# Patient Record
Sex: FEMALE | Race: WHITE | NOT HISPANIC OR LATINO | URBAN - METROPOLITAN AREA
[De-identification: names, ages, dates, MRNs, and addresses within clinical notes are randomized per-mention and may not be internally consistent; named-entity substitution may affect disease eponyms.]

---

## 2023-08-21 PROBLEM — I22.2 SUBSEQUENT NON-ST ELEVATION (NSTEMI) MYOCARDIAL INFARCTION (MULTI): Status: ACTIVE | Noted: 2023-08-21

## 2023-08-21 PROBLEM — R42 ORTHOSTATIC LIGHTHEADEDNESS: Status: ACTIVE | Noted: 2023-08-21

## 2023-08-21 PROBLEM — I25.111: Status: ACTIVE | Noted: 2023-08-21

## 2023-08-21 PROBLEM — Z95.810 AICD (AUTOMATIC CARDIOVERTER/DEFIBRILLATOR) PRESENT: Status: ACTIVE | Noted: 2023-08-21

## 2023-08-21 PROBLEM — I10 HTN (HYPERTENSION): Status: ACTIVE | Noted: 2023-08-21

## 2023-08-21 PROBLEM — E78.5 HYPERLIPIDEMIA: Status: ACTIVE | Noted: 2023-08-21

## 2023-08-21 PROBLEM — I46.9 CARDIOPULMONARY ARREST (MULTI): Status: ACTIVE | Noted: 2023-08-21

## 2023-08-21 PROBLEM — I47.20 SUSTAINED VT (VENTRICULAR TACHYCARDIA) (MULTI): Status: ACTIVE | Noted: 2023-08-21

## 2023-08-21 PROBLEM — I51.4 MYOCARDITIS (MULTI): Status: ACTIVE | Noted: 2023-08-21

## 2023-08-21 RX ORDER — AMIODARONE HYDROCHLORIDE 200 MG/1
1 TABLET ORAL DAILY
COMMUNITY
Start: 2022-06-10 | End: 2023-10-02 | Stop reason: SDUPTHER

## 2023-08-21 RX ORDER — NAPROXEN SODIUM 220 MG/1
1 TABLET, FILM COATED ORAL DAILY
COMMUNITY

## 2023-08-21 RX ORDER — FERROUS SULFATE 325(65) MG
TABLET ORAL
COMMUNITY
Start: 2022-04-12

## 2023-08-21 RX ORDER — HYDROCHLOROTHIAZIDE 12.5 MG/1
CAPSULE ORAL
COMMUNITY
Start: 2023-04-06 | End: 2023-10-02 | Stop reason: WASHOUT

## 2023-08-21 RX ORDER — CYANOCOBALAMIN 1000 UG/ML
1000 INJECTION, SOLUTION INTRAMUSCULAR; SUBCUTANEOUS
COMMUNITY
Start: 2021-11-23

## 2023-08-21 RX ORDER — METOPROLOL SUCCINATE 100 MG/1
1 TABLET, EXTENDED RELEASE ORAL DAILY
COMMUNITY
Start: 2022-06-10 | End: 2023-11-20 | Stop reason: WASHOUT

## 2023-08-21 RX ORDER — LEVOTHYROXINE SODIUM 100 UG/1
TABLET ORAL
COMMUNITY
Start: 2023-02-16

## 2023-08-21 RX ORDER — LOSARTAN POTASSIUM AND HYDROCHLOROTHIAZIDE 25; 100 MG/1; MG/1
1 TABLET ORAL EVERY MORNING
COMMUNITY
End: 2023-10-02 | Stop reason: WASHOUT

## 2023-08-21 RX ORDER — EZETIMIBE 10 MG/1
1 TABLET ORAL DAILY
COMMUNITY
Start: 2022-03-18

## 2023-08-21 RX ORDER — ATORVASTATIN CALCIUM 40 MG/1
1 TABLET, FILM COATED ORAL DAILY
COMMUNITY

## 2023-08-21 RX ORDER — LOSARTAN POTASSIUM 100 MG/1
1 TABLET ORAL DAILY
COMMUNITY
Start: 2022-03-18 | End: 2023-10-02 | Stop reason: WASHOUT

## 2023-10-02 ENCOUNTER — OFFICE VISIT (OUTPATIENT)
Dept: CARDIOLOGY | Facility: CLINIC | Age: 78
End: 2023-10-02
Payer: MEDICARE

## 2023-10-02 VITALS
OXYGEN SATURATION: 96 % | DIASTOLIC BLOOD PRESSURE: 84 MMHG | SYSTOLIC BLOOD PRESSURE: 138 MMHG | BODY MASS INDEX: 34.16 KG/M2 | WEIGHT: 205 LBS | HEART RATE: 86 BPM | HEIGHT: 65 IN

## 2023-10-02 DIAGNOSIS — I25.111 CORONARY ARTERY DISEASE INVOLVING NATIVE CORONARY ARTERY OF NATIVE HEART WITH ANGINA PECTORIS WITH DOCUMENTED SPASM (CMS-HCC): ICD-10-CM

## 2023-10-02 DIAGNOSIS — I47.20 SUSTAINED VT (VENTRICULAR TACHYCARDIA) (MULTI): Primary | ICD-10-CM

## 2023-10-02 PROCEDURE — 1159F MED LIST DOCD IN RCRD: CPT | Performed by: INTERNAL MEDICINE

## 2023-10-02 PROCEDURE — 3075F SYST BP GE 130 - 139MM HG: CPT | Performed by: INTERNAL MEDICINE

## 2023-10-02 PROCEDURE — 99214 OFFICE O/P EST MOD 30 MIN: CPT | Performed by: INTERNAL MEDICINE

## 2023-10-02 PROCEDURE — 3079F DIAST BP 80-89 MM HG: CPT | Performed by: INTERNAL MEDICINE

## 2023-10-02 PROCEDURE — 1160F RVW MEDS BY RX/DR IN RCRD: CPT | Performed by: INTERNAL MEDICINE

## 2023-10-02 PROCEDURE — 93000 ELECTROCARDIOGRAM COMPLETE: CPT | Performed by: INTERNAL MEDICINE

## 2023-10-02 RX ORDER — AMIODARONE HYDROCHLORIDE 200 MG/1
200 TABLET ORAL DAILY
Qty: 90 TABLET | Refills: 3 | Status: SHIPPED | OUTPATIENT
Start: 2023-10-02 | End: 2024-10-01

## 2023-10-02 RX ORDER — LOSARTAN POTASSIUM 50 MG/1
50 TABLET ORAL DAILY
COMMUNITY
Start: 2023-07-28 | End: 2023-11-20 | Stop reason: DRUGHIGH

## 2023-10-02 NOTE — PROGRESS NOTES
Subjective   Patient ID: Radha Addison is a 78 y.o. female who presents for Coronary Artery Disease.  This is a 78-year-old female with a history of sustained ventricular tachycardia.  She has an implantable defibrillator and is taking amiodarone.  No recent VT/VF episodes on the most recent ICD interrogation.  No side effects related to the use of amiodarone.  No other cardiac complaints today.        Review of Systems   All other systems reviewed and are negative.      Objective   Physical Exam  Constitutional:       Appearance: Normal appearance.   Cardiovascular:      Rate and Rhythm: Normal rate and regular rhythm.      Pulses: Normal pulses.      Heart sounds: Normal heart sounds.   Pulmonary:      Effort: Pulmonary effort is normal.      Breath sounds: Normal breath sounds.   Musculoskeletal:         General: No swelling.   Skin:     General: Skin is warm and dry.   Neurological:      General: No focal deficit present.      Mental Status: She is alert.   Psychiatric:         Mood and Affect: Mood normal.         Behavior: Behavior normal.         Assessment/Plan   Problem List Items Addressed This Visit             ICD-10-CM       Cardiac and Vasculature    Coronary artery disease involving native coronary artery of native heart with angina pectoris with documented spasm (CMS/[facility]) I25.111     1.  Sustained ventricular tachycardia: [place] has a history of sustained monomorphic ventricular tachycardia and received a secondary prevention ICD on March 10, 2022.  She has been taking amiodarone and has had a shock for VT.  The most recent ICD interrogation shows no VT/VF episodes.  Continue amiodarone at the same dosage.  If she continues to have suppression of the VT, we will consider decreasing the dosage of amiodarone to 100 mg daily.    2.  Hypertension: Continue losartan at the same dosage.         Relevant Orders    ECG 12 Lead    Sustained VT (ventricular tachycardia) (CMS/HCC) - Primary I47.20     Relevant Medications    amiodarone (Pacerone) 200 mg tablet    Other Relevant Orders    Follow Up In Cardiology

## 2023-10-02 NOTE — ASSESSMENT & PLAN NOTE
1.  Sustained ventricular tachycardia: [place] has a history of sustained monomorphic ventricular tachycardia and received a secondary prevention ICD on March 10, 2022.  She has been taking amiodarone and has had a shock for VT.  The most recent ICD interrogation shows no VT/VF episodes.  Continue amiodarone at the same dosage.  If she continues to have suppression of the VT, we will consider decreasing the dosage of amiodarone to 100 mg daily.    2.  Hypertension: Continue losartan at the same dosage.

## 2023-10-09 DIAGNOSIS — I47.29 PAROXYSMAL VENTRICULAR TACHYCARDIA (MULTI): ICD-10-CM

## 2023-10-09 DIAGNOSIS — Z95.810 AICD (AUTOMATIC CARDIOVERTER/DEFIBRILLATOR) PRESENT: ICD-10-CM

## 2023-11-16 PROBLEM — M54.16 RADICULOPATHY, LUMBAR REGION: Status: ACTIVE | Noted: 2018-08-16

## 2023-11-16 PROBLEM — E03.9 HYPOTHYROIDISM, ACQUIRED: Status: ACTIVE | Noted: 2023-02-16

## 2023-11-16 PROBLEM — R56.9 SEIZURE (MULTI): Status: ACTIVE | Noted: 2023-11-11

## 2023-11-16 PROBLEM — I22.2 SUBSEQUENT NON-ST ELEVATION (NSTEMI) MYOCARDIAL INFARCTION (MULTI): Status: RESOLVED | Noted: 2023-08-21 | Resolved: 2023-11-16

## 2023-11-16 PROBLEM — J30.1 CHRONIC SEASONAL ALLERGIC RHINITIS DUE TO POLLEN: Status: ACTIVE | Noted: 2017-11-27

## 2023-11-16 PROBLEM — I21.4 NSTEMI (NON-ST ELEVATED MYOCARDIAL INFARCTION) (MULTI): Status: ACTIVE | Noted: 2022-03-21

## 2023-11-16 PROBLEM — N18.31 STAGE 3A CHRONIC KIDNEY DISEASE (MULTI): Status: ACTIVE | Noted: 2023-10-03

## 2023-11-16 PROBLEM — Z86.79 HISTORY OF VENTRICULAR TACHYCARDIA: Status: ACTIVE | Noted: 2023-11-11

## 2023-11-20 ENCOUNTER — HOSPITAL ENCOUNTER (OUTPATIENT)
Dept: CARDIOLOGY | Facility: CLINIC | Age: 78
Discharge: HOME | End: 2023-11-20
Payer: MEDICARE

## 2023-11-20 ENCOUNTER — OFFICE VISIT (OUTPATIENT)
Dept: CARDIOLOGY | Facility: CLINIC | Age: 78
End: 2023-11-20
Payer: MEDICARE

## 2023-11-20 VITALS
BODY MASS INDEX: 33.79 KG/M2 | SYSTOLIC BLOOD PRESSURE: 102 MMHG | HEIGHT: 65 IN | OXYGEN SATURATION: 96 % | WEIGHT: 202.8 LBS | DIASTOLIC BLOOD PRESSURE: 48 MMHG | HEART RATE: 99 BPM

## 2023-11-20 DIAGNOSIS — E78.00 PURE HYPERCHOLESTEROLEMIA: ICD-10-CM

## 2023-11-20 DIAGNOSIS — Z09 HOSPITAL DISCHARGE FOLLOW-UP: ICD-10-CM

## 2023-11-20 DIAGNOSIS — Z95.810 PRESENCE OF AUTOMATIC CARDIOVERTER/DEFIBRILLATOR (AICD): ICD-10-CM

## 2023-11-20 DIAGNOSIS — I10 HYPERTENSION, UNSPECIFIED TYPE: Primary | ICD-10-CM

## 2023-11-20 DIAGNOSIS — I47.29 PAROXYSMAL VENTRICULAR TACHYCARDIA (MULTI): ICD-10-CM

## 2023-11-20 DIAGNOSIS — Z95.810 AICD (AUTOMATIC CARDIOVERTER/DEFIBRILLATOR) PRESENT: ICD-10-CM

## 2023-11-20 DIAGNOSIS — I47.20 VT (VENTRICULAR TACHYCARDIA) (MULTI): ICD-10-CM

## 2023-11-20 DIAGNOSIS — I47.20 SUSTAINED VT (VENTRICULAR TACHYCARDIA) (MULTI): ICD-10-CM

## 2023-11-20 DIAGNOSIS — I51.4 MYOCARDITIS, UNSPECIFIED CHRONICITY, UNSPECIFIED MYOCARDITIS TYPE (MULTI): ICD-10-CM

## 2023-11-20 DIAGNOSIS — I25.10 CORONARY ARTERY DISEASE INVOLVING NATIVE CORONARY ARTERY OF NATIVE HEART, UNSPECIFIED WHETHER ANGINA PRESENT: ICD-10-CM

## 2023-11-20 PROCEDURE — 1159F MED LIST DOCD IN RCRD: CPT | Performed by: INTERNAL MEDICINE

## 2023-11-20 PROCEDURE — 3078F DIAST BP <80 MM HG: CPT | Performed by: INTERNAL MEDICINE

## 2023-11-20 PROCEDURE — 93296 REM INTERROG EVL PM/IDS: CPT

## 2023-11-20 PROCEDURE — 1036F TOBACCO NON-USER: CPT | Performed by: INTERNAL MEDICINE

## 2023-11-20 PROCEDURE — 1160F RVW MEDS BY RX/DR IN RCRD: CPT | Performed by: INTERNAL MEDICINE

## 2023-11-20 PROCEDURE — 3074F SYST BP LT 130 MM HG: CPT | Performed by: INTERNAL MEDICINE

## 2023-11-20 PROCEDURE — 99215 OFFICE O/P EST HI 40 MIN: CPT | Performed by: INTERNAL MEDICINE

## 2023-11-20 PROCEDURE — 93295 DEV INTERROG REMOTE 1/2/MLT: CPT | Performed by: INTERNAL MEDICINE

## 2023-11-20 RX ORDER — LOSARTAN POTASSIUM 100 MG/1
100 TABLET ORAL DAILY
Qty: 90 TABLET | Refills: 3 | Status: SHIPPED | OUTPATIENT
Start: 2023-11-20 | End: 2024-05-28 | Stop reason: DRUGHIGH

## 2023-11-20 RX ORDER — LEVETIRACETAM 500 MG/1
500 TABLET, EXTENDED RELEASE ORAL 2 TIMES DAILY
COMMUNITY

## 2023-11-20 NOTE — PROGRESS NOTES
"Chief Complaint:   defibrillator, Coronary Artery Disease, Hypertension, Hyperlipidemia, Ventricular tachycardia, and myocarditis (6 month follow up)     History Of Present Illness:    Radha Addison is a 78 y.o. female presenting after hospital DC for a seizure.  Was noted to have low BP-multiple meds stopped  Occasional dizzines    No additional seizures  Patient denies chest pain/SOB/palpitations/edema/claudication  Activity limited-using walker       Last Recorded Vitals:  Vitals:    11/20/23 1332 11/20/23 1358   BP: 122/60 (!) 102/48   BP Location: Right arm    Patient Position: Sitting    BP Cuff Size: Large adult    Pulse: 99    SpO2: 96%    Weight: 92 kg (202 lb 12.8 oz)    Height: 1.651 m (5' 5\")             Allergies:  Pollen extracts    Outpatient Medications:  Current Outpatient Medications   Medication Instructions    amiodarone (PACERONE) 200 mg, oral, Daily    aspirin 81 mg chewable tablet 1 tablet, oral, Daily    atorvastatin (Lipitor) 40 mg tablet 1 tablet, oral, Daily    cholecalciferol, vitamin D3, (D-3-5 ORAL) 1 capsule, oral, Daily    cyanocobalamin (VITAMIN B-12) 1,000 mcg, intramuscular, Every 30 days    ezetimibe (Zetia) 10 mg tablet 1 tablet, oral, Daily    ferrous sulfate 325 (65 Fe) MG tablet oral    levETIRAcetam XR (KEPPRA XR) 500 mg, oral, 2 times daily, Do not crush, chew, or split.    levothyroxine (Synthroid, Levoxyl) 100 mcg tablet     losartan (COZAAR) 100 mg, oral, Daily    syringe with needle 3 mL 25 gauge x 1\" syringe miscellaneous, Use as directed with b12 injections        Physical Exam:  Constitutional:       General: Awake.      Appearance: Not in distress. Obese. Chronically ill-appearing.   Neck:      Vascular: No JVR. JVD normal.   Pulmonary:      Effort: Pulmonary effort is normal.      Breath sounds: Normal breath sounds. No wheezing. No rhonchi. No rales.   Chest:      Chest wall: Not tender to palpatation.   Cardiovascular:      PMI at left midclavicular line. " Normal rate. Regular rhythm. Normal S1. Normal S2.       Murmurs: There is no murmur.      No gallop.  No click. No rub.   Pulses:     Intact distal pulses.   Edema:     Peripheral edema absent.   Abdominal:      General: Abdomen is protuberant. Bowel sounds are normal.      Palpations: Abdomen is soft.      Tenderness: There is no abdominal tenderness.   Musculoskeletal: Normal range of motion.         General: No tenderness.      Comments: Walks with walker Skin:     General: Skin is warm and dry.   Neurological:      General: No focal deficit present.      Mental Status: Alert and oriented to person, place and time.          Last Labs:  CBC -  Lab Results   Component Value Date    WBC 9.3 03/11/2022    HGB 12.5 03/11/2022    HCT 39.0 03/11/2022    MCV 87 03/11/2022     03/11/2022       CMP -  Lab Results   Component Value Date    CALCIUM 9.4 02/08/2023    PROT 7.3 03/05/2022    ALBUMIN 4.2 03/05/2022    AST 18 03/05/2022    ALT 11 03/05/2022    ALKPHOS 105 03/05/2022    BILITOT 0.7 03/05/2022       LIPID PANEL -   Lab Results   Component Value Date    CHOL 107 03/06/2022    TRIG 114 03/06/2022    HDL 41.4 03/06/2022    CHHDL 2.6 03/06/2022    LDLF 43 03/06/2022    VLDL 23 03/06/2022       RENAL FUNCTION PANEL -   Lab Results   Component Value Date    GLUCOSE 100 (H) 02/08/2023     02/08/2023    K 4.6 02/08/2023     02/08/2023    CO2 28 02/08/2023    ANIONGAP 13 02/08/2023    BUN 21 02/08/2023    CREATININE 1.09 (H) 02/08/2023    CALCIUM 9.4 02/08/2023    ALBUMIN 4.2 03/05/2022        Lab Results   Component Value Date    HGBA1C 5.8 (H) 11/11/2023           Lab review: I have personally reviewed the laboratory result(s)       Problem List Items Addressed This Visit       AICD (automatic cardioverter/defibrillator) present    Overview     Placed 3/2022 for VT   Follow with EP         HTN (hypertension) - Primary    Overview     BP low on my reading today  Generally OK on home readings  Follow          Hyperlipidemia    Overview     On statin / Zetia   11/2023 LDL=42         Myocarditis (CMS/[facility])    Overview     Per 3/2022 cardiac MRI   NL LVEF   No obstructive CAD per 3/2022 cath   On ARB  Follow         Sustained VT (ventricular tachycardia) (CMS/[facility])    Overview     Noted on 3/2022 hospital admit   On amiodarone and AICD in place   Note -was on beta blocker but Dc'ed durinh 11/2023 hospital admission for low BP/seizure  Follow         CAD (coronary artery disease)    Overview     Mild irregularities with intramyocardial LAD per 3/2022 cath   On ASA / statin / beta blocker - no angina         Hospital discharge follow-up    Overview     Records reviewed                Giovani Burns DO

## 2024-02-19 ENCOUNTER — HOSPITAL ENCOUNTER (OUTPATIENT)
Dept: CARDIOLOGY | Facility: CLINIC | Age: 79
Discharge: HOME | End: 2024-02-19
Payer: MEDICARE

## 2024-02-19 DIAGNOSIS — I47.29 PAROXYSMAL VENTRICULAR TACHYCARDIA (MULTI): ICD-10-CM

## 2024-02-19 DIAGNOSIS — Z95.810 AICD (AUTOMATIC CARDIOVERTER/DEFIBRILLATOR) PRESENT: ICD-10-CM

## 2024-02-19 PROCEDURE — 93296 REM INTERROG EVL PM/IDS: CPT

## 2024-02-19 PROCEDURE — 93295 DEV INTERROG REMOTE 1/2/MLT: CPT | Performed by: INTERNAL MEDICINE

## 2024-05-09 PROBLEM — G40.309 NONINTRACTABLE GENERALIZED IDIOPATHIC EPILEPSY WITHOUT STATUS EPILEPTICUS (MULTI): Status: ACTIVE | Noted: 2023-11-22

## 2024-05-20 ENCOUNTER — HOSPITAL ENCOUNTER (OUTPATIENT)
Dept: CARDIOLOGY | Facility: CLINIC | Age: 79
Discharge: HOME | End: 2024-05-20
Payer: MEDICARE

## 2024-05-20 ENCOUNTER — APPOINTMENT (OUTPATIENT)
Dept: CARDIOLOGY | Facility: CLINIC | Age: 79
End: 2024-05-20
Payer: COMMERCIAL

## 2024-05-20 DIAGNOSIS — Z95.810 AICD (AUTOMATIC CARDIOVERTER/DEFIBRILLATOR) PRESENT: ICD-10-CM

## 2024-05-20 DIAGNOSIS — I47.29 PAROXYSMAL VENTRICULAR TACHYCARDIA (MULTI): ICD-10-CM

## 2024-05-28 ENCOUNTER — OFFICE VISIT (OUTPATIENT)
Dept: CARDIOLOGY | Facility: CLINIC | Age: 79
End: 2024-05-28
Payer: MEDICARE

## 2024-05-28 VITALS
BODY MASS INDEX: 33.28 KG/M2 | SYSTOLIC BLOOD PRESSURE: 105 MMHG | DIASTOLIC BLOOD PRESSURE: 50 MMHG | WEIGHT: 200 LBS | OXYGEN SATURATION: 96 % | HEART RATE: 78 BPM

## 2024-05-28 DIAGNOSIS — I51.4 MYOCARDITIS, UNSPECIFIED CHRONICITY, UNSPECIFIED MYOCARDITIS TYPE (MULTI): ICD-10-CM

## 2024-05-28 DIAGNOSIS — I25.10 CORONARY ARTERY DISEASE INVOLVING NATIVE CORONARY ARTERY OF NATIVE HEART, UNSPECIFIED WHETHER ANGINA PRESENT: Primary | ICD-10-CM

## 2024-05-28 DIAGNOSIS — I10 HYPERTENSION, UNSPECIFIED TYPE: ICD-10-CM

## 2024-05-28 DIAGNOSIS — E78.00 PURE HYPERCHOLESTEROLEMIA: ICD-10-CM

## 2024-05-28 DIAGNOSIS — Z95.810 AICD (AUTOMATIC CARDIOVERTER/DEFIBRILLATOR) PRESENT: ICD-10-CM

## 2024-05-28 DIAGNOSIS — I47.20 SUSTAINED VT (VENTRICULAR TACHYCARDIA) (MULTI): ICD-10-CM

## 2024-05-28 PROBLEM — R47.02 EXPRESSIVE DYSPHASIA: Status: ACTIVE | Noted: 2024-05-28

## 2024-05-28 PROBLEM — I63.9 CEREBROVASCULAR ACCIDENT (CVA) OF UNCERTAIN PATHOLOGY (MULTI): Status: ACTIVE | Noted: 2024-05-28

## 2024-05-28 PROBLEM — R25.1 TREMOR: Status: ACTIVE | Noted: 2024-05-28

## 2024-05-28 PROCEDURE — 1036F TOBACCO NON-USER: CPT | Performed by: INTERNAL MEDICINE

## 2024-05-28 PROCEDURE — 3078F DIAST BP <80 MM HG: CPT | Performed by: INTERNAL MEDICINE

## 2024-05-28 PROCEDURE — 1159F MED LIST DOCD IN RCRD: CPT | Performed by: INTERNAL MEDICINE

## 2024-05-28 PROCEDURE — 3074F SYST BP LT 130 MM HG: CPT | Performed by: INTERNAL MEDICINE

## 2024-05-28 PROCEDURE — 99214 OFFICE O/P EST MOD 30 MIN: CPT | Performed by: INTERNAL MEDICINE

## 2024-05-28 RX ORDER — LOSARTAN POTASSIUM 100 MG/1
50 TABLET ORAL DAILY
Qty: 45 TABLET | Refills: 3 | Status: SHIPPED | OUTPATIENT
Start: 2024-05-28 | End: 2025-05-28

## 2024-05-28 RX ORDER — OLOPATADINE HYDROCHLORIDE 2 MG/ML
1 SOLUTION/ DROPS OPHTHALMIC 2 TIMES DAILY
COMMUNITY
Start: 2024-04-04 | End: 2024-10-01

## 2024-05-28 RX ORDER — HYDRALAZINE HYDROCHLORIDE 25 MG/1
TABLET, FILM COATED ORAL
COMMUNITY
Start: 2023-11-06 | End: 2024-05-28 | Stop reason: WASHOUT

## 2024-05-28 NOTE — PROGRESS NOTES
"Chief Complaint:   Follow-up (Patient is here for a follow up)     History Of Present Illness:    Radha Addison is a 79 y.o. female presenting for CV dz  Occasional orthostatic LH  Patient denies chest pain/SOB/palpitations/edema/claudication  Active-walks/PT         Last Recorded Vitals:  Vitals:    05/28/24 1416 05/28/24 1439   BP: 140/59 105/50   BP Location: Right arm    Patient Position: Sitting    BP Cuff Size: Large adult    Pulse: 78    SpO2: 96%    Weight: 90.7 kg (200 lb)             Allergies:  Pollen extracts    Outpatient Medications:  Current Outpatient Medications   Medication Instructions    amiodarone (PACERONE) 200 mg, oral, Daily    aspirin 81 mg chewable tablet 1 tablet, oral, Daily    atorvastatin (Lipitor) 40 mg tablet 1 tablet, oral, Daily    cholecalciferol, vitamin D3, (D-3-5 ORAL) 1 capsule, oral, Daily    cyanocobalamin (VITAMIN B-12) 1,000 mcg, intramuscular, Every 30 days    ezetimibe (Zetia) 10 mg tablet 1 tablet, oral, Daily    ferrous sulfate 325 (65 Fe) MG tablet oral    levETIRAcetam XR (KEPPRA XR) 500 mg, oral, 2 times daily, Do not crush, chew, or split.    levothyroxine (Synthroid, Levoxyl) 100 mcg tablet     losartan (COZAAR) 50 mg, oral, Daily    olopatadine (Pataday) 0.2 % ophthalmic solution 1 drop, ophthalmic (eye), 2 times daily    syringe with needle 3 mL 25 gauge x 1\" syringe miscellaneous, Use as directed with b12 injections        Physical Exam:  Constitutional:       General: Awake.      Appearance: Not in distress. Obese. Chronically ill-appearing.   Neck:      Vascular: No JVR. JVD normal.   Pulmonary:      Effort: Pulmonary effort is normal.      Breath sounds: Normal breath sounds. No wheezing. No rhonchi. No rales.   Chest:      Chest wall: Not tender to palpatation.   Cardiovascular:      PMI at left midclavicular line. Normal rate. Regular rhythm. Normal S1. Normal S2.       Murmurs: There is no murmur.      No gallop.  No click. No rub.   Pulses:     Intact " distal pulses.   Edema:     Peripheral edema absent.   Abdominal:      General: Abdomen is protuberant. Bowel sounds are normal.      Palpations: Abdomen is soft.      Tenderness: There is no abdominal tenderness.   Musculoskeletal: Normal range of motion.         General: No tenderness.      Comments: Walks with walker Skin:     General: Skin is warm and dry.   Neurological:      General: No focal deficit present.      Mental Status: Alert and oriented to person, place and time.          Last Labs:  CBC -  Lab Results   Component Value Date    WBC 9.3 03/11/2022    HGB 12.5 03/11/2022    HCT 39.0 03/11/2022    MCV 87 03/11/2022     03/11/2022       CMP -  Lab Results   Component Value Date    CALCIUM 9.4 02/08/2023    PROT 7.3 03/05/2022    ALBUMIN 4.2 03/05/2022    AST 18 03/05/2022    ALT 11 03/05/2022    ALKPHOS 105 03/05/2022    BILITOT 0.7 03/05/2022       LIPID PANEL -   Lab Results   Component Value Date    CHOL 107 03/06/2022    TRIG 114 03/06/2022    HDL 41.4 03/06/2022    CHHDL 2.6 03/06/2022    LDLF 43 03/06/2022    VLDL 23 03/06/2022 4/2024  Total cholesterol 114  Triglycerides 75  HDL 73  LDL 26    RENAL FUNCTION PANEL -   Lab Results   Component Value Date    GLUCOSE 100 (H) 02/08/2023     02/08/2023    K 4.6 02/08/2023     02/08/2023    CO2 28 02/08/2023    ANIONGAP 13 02/08/2023    BUN 21 02/08/2023    CREATININE 1.09 (H) 02/08/2023    CALCIUM 9.4 02/08/2023    ALBUMIN 4.2 03/05/2022 4/2024   Cr=1.11  K=4.2    Lab Results   Component Value Date    HGBA1C 5.8 (H) 11/11/2023           Lab review: I have personally reviewed the laboratory result(s)       Problem List Items Addressed This Visit       AICD (automatic cardioverter/defibrillator) present    Overview     Placed 3/2022 for VT   Follow with EP         HTN (hypertension)    Overview     BP low on my reading today  Has some orthostatic  symptoms-decrease losartan  Follow         Hyperlipidemia    Overview     On statin /  Zetia   11/2023 LDL=42         Myocarditis (Multi)    Overview     Per 3/2022 cardiac MRI (EF 58%)  NL LVEF   No obstructive CAD per 3/2022 cath   On ARB  No signs CHF         Sustained VT (ventricular tachycardia) (Multi)    Overview     Noted on 3/2022 hospital admit   On amiodarone and AICD in place   Note -was on beta blocker but Dc'ed during 11/2023 hospital admission for low BP/seizure  Follow         CAD (coronary artery disease) - Primary    Overview     Mild irregularities with intramyocardial LAD per 3/2022 cath   On ASA / statin (no beta  blocker with low B P)- no angina            Decrease losartan to 1/2 tablet(50 mg0 daily as BP on low side    Weight loss  Giovani Burns, DO

## 2024-06-04 ENCOUNTER — HOSPITAL ENCOUNTER (OUTPATIENT)
Dept: CARDIOLOGY | Facility: CLINIC | Age: 79
Discharge: HOME | End: 2024-06-04
Payer: MEDICARE

## 2024-06-04 DIAGNOSIS — Z95.810 PRESENCE OF AUTOMATIC (IMPLANTABLE) CARDIAC DEFIBRILLATOR: ICD-10-CM

## 2024-06-04 DIAGNOSIS — I47.29 OTHER VENTRICULAR TACHYCARDIA (MULTI): ICD-10-CM

## 2024-06-04 PROCEDURE — 93283 PRGRMG EVAL IMPLANTABLE DFB: CPT

## 2024-06-04 PROCEDURE — 93283 PRGRMG EVAL IMPLANTABLE DFB: CPT | Performed by: INTERNAL MEDICINE

## 2024-08-12 ENCOUNTER — HOSPITAL ENCOUNTER (OUTPATIENT)
Dept: CARDIOLOGY | Facility: CLINIC | Age: 79
Discharge: HOME | End: 2024-08-12
Payer: MEDICARE

## 2024-08-12 DIAGNOSIS — Z95.810 AICD (AUTOMATIC CARDIOVERTER/DEFIBRILLATOR) PRESENT: ICD-10-CM

## 2024-08-12 DIAGNOSIS — I47.29 PAROXYSMAL VENTRICULAR TACHYCARDIA (MULTI): ICD-10-CM

## 2024-08-19 ENCOUNTER — HOSPITAL ENCOUNTER (OUTPATIENT)
Dept: CARDIOLOGY | Facility: CLINIC | Age: 79
Discharge: HOME | End: 2024-08-19
Payer: MEDICARE

## 2024-08-19 DIAGNOSIS — I47.29 PAROXYSMAL VENTRICULAR TACHYCARDIA (MULTI): ICD-10-CM

## 2024-08-19 DIAGNOSIS — Z95.810 AICD (AUTOMATIC CARDIOVERTER/DEFIBRILLATOR) PRESENT: ICD-10-CM

## 2024-08-22 ENCOUNTER — OFFICE VISIT (OUTPATIENT)
Dept: CARDIOLOGY | Facility: CLINIC | Age: 79
End: 2024-08-22
Payer: MEDICARE

## 2024-08-22 VITALS
WEIGHT: 192 LBS | BODY MASS INDEX: 31.95 KG/M2 | HEART RATE: 87 BPM | DIASTOLIC BLOOD PRESSURE: 73 MMHG | SYSTOLIC BLOOD PRESSURE: 163 MMHG

## 2024-08-22 DIAGNOSIS — I47.20 SUSTAINED VT (VENTRICULAR TACHYCARDIA) (MULTI): ICD-10-CM

## 2024-08-22 DIAGNOSIS — I46.9 CARDIOPULMONARY ARREST (MULTI): Primary | ICD-10-CM

## 2024-08-22 DIAGNOSIS — I63.9: ICD-10-CM

## 2024-08-22 DIAGNOSIS — I48.0 PAROXYSMAL ATRIAL FIBRILLATION (MULTI): ICD-10-CM

## 2024-08-22 DIAGNOSIS — I25.119 CORONARY ARTERY DISEASE WITH ANGINA PECTORIS, UNSPECIFIED VESSEL OR LESION TYPE, UNSPECIFIED WHETHER NATIVE OR TRANSPLANTED HEART (CMS-HCC): ICD-10-CM

## 2024-08-22 PROCEDURE — 99214 OFFICE O/P EST MOD 30 MIN: CPT | Performed by: PHYSICIAN ASSISTANT

## 2024-08-22 PROCEDURE — 93005 ELECTROCARDIOGRAM TRACING: CPT | Performed by: PHYSICIAN ASSISTANT

## 2024-08-22 ASSESSMENT — ENCOUNTER SYMPTOMS
LOSS OF SENSATION IN FEET: 1
DEPRESSION: 0
OCCASIONAL FEELINGS OF UNSTEADINESS: 0

## 2024-08-22 NOTE — PROGRESS NOTES
Chief Complaint:   Newly realized paroxysmal atrial fibrillation     History Of Present Illness:    Radha Addison is a 79 y.o. female presenting with recently noted atrial fibrillation per recently completed ICD interrogation.  Patient recently presented for scheduled device interrogation which displayed no device discharges, however episodes of rapid atrial fibrillation.  [id number] = 5 consistent with high risk of CVA, therefore discussed initiation of Eliquis 5mg BID - patient is agreeable with this plan.  Patient denies chest pain, chest pressure, palpitations, dyspnea on exertion, shortness of breath at rest, diaphoresis, nausea/vomiting, back pain, headache, lightheadedness, dizziness, syncope or presyncopal episodes, active bleeding signs or symptoms, excessive weight gain, muscle or joint pain, claudication.       Last Recorded Vitals:  Vitals:    08/22/24 1429   BP: 163/73   BP Location: Left arm   Patient Position: Sitting   Pulse: 87   Weight: 87.1 kg (192 lb)       Past Medical History:  She has a past medical history of Personal history of other diseases of the circulatory system (03/18/2022).    Past Surgical History:  She has a past surgical history that includes Other surgical history (04/11/2022).      Social History:  She reports that she has never smoked. She has never used smokeless tobacco. No history on file for alcohol use and drug use.    Family History:  Family History   Problem Relation Name Age of Onset    No Known Problems Mother      No Known Problems Father      No Known Problems Child      No Known Problems Sibling          Allergies:  Pollen extracts    Outpatient Medications:  Current Outpatient Medications   Medication Instructions    amiodarone (PACERONE) 200 mg, oral, Daily    aspirin 81 mg chewable tablet 1 tablet, oral, Daily    atorvastatin (Lipitor) 40 mg tablet 1 tablet, oral, Daily    cholecalciferol, vitamin D3, (D-3-5 ORAL) 1 capsule, oral, Daily    cyanocobalamin  "(VITAMIN B-12) 1,000 mcg, intramuscular, Every 30 days    ezetimibe (Zetia) 10 mg tablet 1 tablet, oral, Daily    ferrous sulfate 325 (65 Fe) MG tablet oral    levETIRAcetam XR (KEPPRA XR) 500 mg, oral, 2 times daily, Do not crush, chew, or split.    levothyroxine (Synthroid, Levoxyl) 100 mcg tablet     losartan (COZAAR) 50 mg, oral, Daily    olopatadine (Pataday) 0.2 % ophthalmic solution 1 drop, ophthalmic (eye), 2 times daily    syringe with needle 3 mL 25 gauge x 1\" syringe miscellaneous, Use as directed with b12 injections        Physical Exam:  Constitutional: awake and alert, oriented ×3, no apparent distress  Skin: warm, dry, good turgor no obvious lesions  Eyes: pupils equal, round, reactive to light, conjunctiva pink and noninjected, no discharge  HENT: normocephalic and atraumatic, mucous membranes moist, trachea midline with no masses/goiter  Cardiovascular: S1/S2 regular, no murmur no rubs/gallops, no carotid bruits, no JVD  Pulmonary: symmetrical chest expansion, lungs are clear to auscultation bilaterally, no wheezes/rales/rhonchi, normal effort  Abdomen: nontender, nondistended, active bowel sounds, no ascites  Extremities: no cyanosis, clubbing, no LE edema no lesions; palpable pedal pulses  Neurologic: cranial nerves II - XII grossly intact, stable gait, no tremor       Last Labs:  CBC -  Lab Results   Component Value Date    WBC 9.3 03/11/2022    HGB 12.5 03/11/2022    HCT 39.0 03/11/2022    MCV 87 03/11/2022     03/11/2022       CMP -  Lab Results   Component Value Date    CALCIUM 9.4 02/08/2023    PROT 7.3 03/05/2022    ALBUMIN 4.2 03/05/2022    AST 18 03/05/2022    ALT 11 03/05/2022    ALKPHOS 105 03/05/2022    BILITOT 0.7 03/05/2022       LIPID PANEL -   Lab Results   Component Value Date    CHOL 107 03/06/2022    TRIG 114 03/06/2022    HDL 41.4 03/06/2022    CHHDL 2.6 03/06/2022    LDLF 43 03/06/2022    VLDL 23 03/06/2022       RENAL FUNCTION PANEL -   Lab Results   Component Value " "Date    GLUCOSE 100 (H) 02/08/2023     02/08/2023    K 4.6 02/08/2023     02/08/2023    CO2 28 02/08/2023    ANIONGAP 13 02/08/2023    BUN 21 02/08/2023    CREATININE 1.09 (H) 02/08/2023    CALCIUM 9.4 02/08/2023    ALBUMIN 4.2 03/05/2022        Lab Results   Component Value Date    HGBA1C 5.8 (H) 11/11/2023       Last Cardiology Tests:  ECG:  ECG 12 Lead 10/02/2023      Echo:  No results found for this or any previous visit from the past 1095 days.      Ejection Fractions:  No results found for: \"EF\"    Cath:  No results found for this or any previous visit from the past 1095 days.      Stress Test:  No results found for this or any previous visit from the past 1095 days.      Cardiac Imaging:  MR CARDIAC MORPHOLOGY AND FUNCTION W AND WO IV CONTRAST 03/09/2022      Assessment/Plan   Problem List Items Addressed This Visit             ICD-10-CM       Cardiac and Vasculature    Cardiopulmonary arrest (Multi) - Primary I46.9    Sustained VT (ventricular tachycardia) (Multi) I47.20    CAD (coronary artery disease) I25.10    Relevant Orders    ECG 12 lead (Clinic Performed)    Paroxysmal atrial fibrillation (Multi) I48.0    Relevant Medications    apixaban (Eliquis) 5 mg tablet    apixaban (Eliquis) 5 mg tablet       Neuro    Cerebrovascular accident (CVA) of uncertain pathology (Multi) I63.9       -[id number] = 5 consistent with high risk for thrombotic CVA    -DC ASA as patient has only mild CAD    -We will initiate Eliquis 5mg BID for CVA prophylaxis    -F/U with Dr. Ramicone and Dr. Burns as scheduled    Kai Andres PA-C  "

## 2024-09-09 PROBLEM — Z86.79 HISTORY OF VENTRICULAR TACHYCARDIA: Status: RESOLVED | Noted: 2023-11-11 | Resolved: 2024-09-09

## 2024-09-17 DIAGNOSIS — I48.0 PAROXYSMAL ATRIAL FIBRILLATION (MULTI): ICD-10-CM

## 2024-09-17 NOTE — TELEPHONE ENCOUNTER
Good afternoon,   Patient called, she was prescribed by Kai Andres Eliquis 5 mg, 2 tines daily. Kai sent this prescription to [facility], but patient said it supposed to go to to [facility]. I checked and yes it said [facility] but the Eliquis was sent to [facility]. Please if this can be fix since patient said she only have a couple pills left for this month.  Thanks  Mariaelena

## 2024-10-01 PROBLEM — I47.20 SUSTAINED VT (VENTRICULAR TACHYCARDIA) (MULTI): Chronic | Status: ACTIVE | Noted: 2023-08-21

## 2024-10-01 PROBLEM — Z95.810 AICD (AUTOMATIC CARDIOVERTER/DEFIBRILLATOR) PRESENT: Chronic | Status: ACTIVE | Noted: 2023-08-21

## 2024-10-02 ENCOUNTER — APPOINTMENT (OUTPATIENT)
Dept: CARDIOLOGY | Facility: CLINIC | Age: 79
End: 2024-10-02
Payer: COMMERCIAL

## 2024-10-02 VITALS
DIASTOLIC BLOOD PRESSURE: 80 MMHG | OXYGEN SATURATION: 97 % | HEART RATE: 99 BPM | BODY MASS INDEX: 31.49 KG/M2 | HEIGHT: 65 IN | WEIGHT: 189 LBS | SYSTOLIC BLOOD PRESSURE: 144 MMHG

## 2024-10-02 DIAGNOSIS — Z95.810 AICD (AUTOMATIC CARDIOVERTER/DEFIBRILLATOR) PRESENT: Primary | ICD-10-CM

## 2024-10-02 DIAGNOSIS — I47.20 SUSTAINED VT (VENTRICULAR TACHYCARDIA) (MULTI): ICD-10-CM

## 2024-10-02 PROCEDURE — 3077F SYST BP >= 140 MM HG: CPT | Performed by: INTERNAL MEDICINE

## 2024-10-02 PROCEDURE — 1036F TOBACCO NON-USER: CPT | Performed by: INTERNAL MEDICINE

## 2024-10-02 PROCEDURE — 99214 OFFICE O/P EST MOD 30 MIN: CPT | Performed by: INTERNAL MEDICINE

## 2024-10-02 PROCEDURE — 3079F DIAST BP 80-89 MM HG: CPT | Performed by: INTERNAL MEDICINE

## 2024-10-02 PROCEDURE — 1159F MED LIST DOCD IN RCRD: CPT | Performed by: INTERNAL MEDICINE

## 2024-10-02 NOTE — ASSESSMENT & PLAN NOTE
This patient has a history of sustained monomorphic ventricular tachycardia and received a secondary prevention ICD March 10, 2022.  No recent ICD shocks for VT/VF.  Continue amiodarone 200 mg daily.  She has been off beta-blocker therapy because of her tendency toward orthostatic hypotension, and she has had syncope secondary to orthostatic hypotension.  Continue amiodarone at the same dosage.  EP follow-up in 1 year.

## 2024-10-02 NOTE — PROGRESS NOTES
"History Of Present Illness:      This is a 78-year-old female with a history of sustained ventricular tachycardia.  She has a Medtronic ICD, and has been on amiodarone.  No recent VT/VF episodes.  She also has a history of orthostatic hypotension and has had syncope    Review of Systems  Other review of systems negative     Last Recorded Vitals:      5/17/2023     2:08 PM 10/2/2023     1:41 PM 11/20/2023     1:32 PM 11/20/2023     1:58 PM 5/28/2024     2:16 PM 5/28/2024     2:39 PM 8/22/2024     2:29 PM   Vitals   Systolic 140 138 122 102 140 105 163   Diastolic 65 84 60 48 59 50 73   Heart Rate  86 99  78  87   Height (in)  1.651 m (5' 5\") 1.651 m (5' 5\")       Weight (lb)  205 202.8  200  192   BMI  34.11 kg/m2 33.75 kg/m2  33.28 kg/m2  31.95 kg/m2   BSA (m2)  2.07 m2 2.05 m2  2.04 m2  2 m2   Visit Report  Report Report Report Report Report Report     Allergies:  Pollen extracts    Outpatient Medications:  Current Outpatient Medications   Medication Instructions    amiodarone (PACERONE) 200 mg, oral, Daily    apixaban (ELIQUIS) 5 mg, oral, 2 times daily    apixaban (ELIQUIS) 5 mg, oral, 2 times daily    aspirin 81 mg chewable tablet 1 tablet, oral, Daily    atorvastatin (Lipitor) 40 mg tablet 1 tablet, oral, Daily    cholecalciferol, vitamin D3, (D-3-5 ORAL) 1 capsule, oral, Daily    cyanocobalamin (VITAMIN B-12) 1,000 mcg, intramuscular, Every 30 days    ezetimibe (Zetia) 10 mg tablet 1 tablet, oral, Daily    ferrous sulfate 325 (65 Fe) MG tablet oral    levETIRAcetam XR (KEPPRA XR) 500 mg, oral, 2 times daily, Do not crush, chew, or split.    levothyroxine (Synthroid, Levoxyl) 100 mcg tablet     losartan (COZAAR) 50 mg, oral, Daily    olopatadine (Pataday) 0.2 % ophthalmic solution 1 drop, ophthalmic (eye), 2 times daily    syringe with needle 3 mL 25 gauge x 1\" syringe miscellaneous, Use as directed with b12 injections      Physical Exam:    General Appearance:  Alert, oriented, no distress  Skin:  Warm and " dry  Head and Neck:  No elevation of JVP, no carotid bruits  Cardiac Exam:  Rhythm is regular, S1 and S2 are normal, no murmur S3 or S4  Lungs:  Clear to auscultation  Extremities:  no edema  Neurologic:  No focal deficits  Psychiatric:  Appropriate mood and behavior    Cardiology Tests:  I have personally review the diagnostic cardiac testing and my interpretation is as follows:    Echocardiogram March 2022: Ejection fraction 60 to 65%    Assessment/Plan   Problem List Items Addressed This Visit             ICD-10-CM    AICD (automatic cardioverter/defibrillator) present - Primary (Chronic) Z95.810     Medtronic [place] XT DR MRI implanted March 10, 2022:  -RA Medtronic [id number]  -RV Medtronic [id number]  -Estimated battery longevity 9 years, 3 months based on device interrogation August 2024         Sustained VT (ventricular tachycardia) (Multi) (Chronic) I47.20     This patient has a history of sustained monomorphic ventricular tachycardia and received a secondary prevention ICD March 10, 2022.  No recent ICD shocks for VT/VF.  Continue amiodarone 200 mg daily.  She has been off beta-blocker therapy because of her tendency toward orthostatic hypotension, and she has had syncope secondary to orthostatic hypotension.  Continue amiodarone at the same dosage.  EP follow-up in 1 year.          James C Ramicone, DO

## 2024-10-02 NOTE — ASSESSMENT & PLAN NOTE
Medtronic [place] XT  MRI implanted March 10, 2022:  -RA Medtronic [id number]  -RV Medtronic [id number]  -Estimated battery longevity 9 years, 3 months based on device interrogation August 2024

## 2024-11-18 ENCOUNTER — HOSPITAL ENCOUNTER (OUTPATIENT)
Dept: CARDIOLOGY | Facility: CLINIC | Age: 79
Discharge: HOME | End: 2024-11-18
Payer: MEDICARE

## 2024-11-18 DIAGNOSIS — I47.29 PAROXYSMAL VENTRICULAR TACHYCARDIA (MULTI): ICD-10-CM

## 2024-11-18 DIAGNOSIS — Z95.810 AICD (AUTOMATIC CARDIOVERTER/DEFIBRILLATOR) PRESENT: ICD-10-CM

## 2024-11-18 PROCEDURE — 93296 REM INTERROG EVL PM/IDS: CPT

## 2024-11-27 ENCOUNTER — APPOINTMENT (OUTPATIENT)
Dept: CARDIOLOGY | Facility: CLINIC | Age: 79
End: 2024-11-27
Payer: MEDICARE

## 2024-11-27 VITALS
DIASTOLIC BLOOD PRESSURE: 55 MMHG | BODY MASS INDEX: 30.62 KG/M2 | WEIGHT: 184 LBS | HEART RATE: 66 BPM | SYSTOLIC BLOOD PRESSURE: 122 MMHG | OXYGEN SATURATION: 98 %

## 2024-11-27 DIAGNOSIS — I47.20 SUSTAINED VT (VENTRICULAR TACHYCARDIA) (MULTI): Chronic | ICD-10-CM

## 2024-11-27 DIAGNOSIS — I10 HYPERTENSION, UNSPECIFIED TYPE: ICD-10-CM

## 2024-11-27 DIAGNOSIS — I51.4 MYOCARDITIS, UNSPECIFIED CHRONICITY, UNSPECIFIED MYOCARDITIS TYPE (MULTI): ICD-10-CM

## 2024-11-27 DIAGNOSIS — I48.0 PAROXYSMAL ATRIAL FIBRILLATION (MULTI): ICD-10-CM

## 2024-11-27 DIAGNOSIS — I25.10 CORONARY ARTERY DISEASE INVOLVING NATIVE CORONARY ARTERY OF NATIVE HEART, UNSPECIFIED WHETHER ANGINA PRESENT: Primary | ICD-10-CM

## 2024-11-27 DIAGNOSIS — E78.00 PURE HYPERCHOLESTEROLEMIA: ICD-10-CM

## 2024-11-27 DIAGNOSIS — Z95.810 AICD (AUTOMATIC CARDIOVERTER/DEFIBRILLATOR) PRESENT: Chronic | ICD-10-CM

## 2024-11-27 PROCEDURE — 3074F SYST BP LT 130 MM HG: CPT | Performed by: INTERNAL MEDICINE

## 2024-11-27 PROCEDURE — G2211 COMPLEX E/M VISIT ADD ON: HCPCS | Performed by: INTERNAL MEDICINE

## 2024-11-27 PROCEDURE — 1159F MED LIST DOCD IN RCRD: CPT | Performed by: INTERNAL MEDICINE

## 2024-11-27 PROCEDURE — 3078F DIAST BP <80 MM HG: CPT | Performed by: INTERNAL MEDICINE

## 2024-11-27 PROCEDURE — 99214 OFFICE O/P EST MOD 30 MIN: CPT | Performed by: INTERNAL MEDICINE

## 2024-11-27 NOTE — PROGRESS NOTES
"Chief Complaint:   Follow-up and Hypertension     History Of Present Illness:    Radha Addison is a 79 y.o. female presenting for CV dz  Since her last office visit she was found to have paroxysmal atrial fibrillation on AICD interrogation and was started on Eliquis-no bleeding issues  No sustained palpitations  Occasional orthostatic LH  Patient denies chest pain/SOB/edema/claudication  Active-walks/PT         Last Recorded Vitals:  Vitals:    11/27/24 1329 11/27/24 1346   BP: 138/62 122/55   Pulse: 66    SpO2: 98%    Weight: 83.5 kg (184 lb)             Allergies:  Pollen extracts    Outpatient Medications:  Current Outpatient Medications   Medication Instructions    amiodarone (PACERONE) 200 mg, oral, Daily    apixaban (ELIQUIS) 5 mg, oral, 2 times daily    atorvastatin (Lipitor) 40 mg tablet 1 tablet, Daily    cholecalciferol, vitamin D3, (D-3-5 ORAL) 1 capsule, Daily    cyanocobalamin (VITAMIN B-12) 1,000 mcg, Every 30 days    ezetimibe (Zetia) 10 mg tablet 1 tablet, Daily    ferrous sulfate 325 (65 Fe) MG tablet Take by mouth.    levETIRAcetam XR (KEPPRA XR) 500 mg, 2 times daily    levothyroxine (Synthroid, Levoxyl) 100 mcg tablet     losartan (COZAAR) 50 mg, oral, Daily    olopatadine (Pataday) 0.2 % ophthalmic solution 1 drop, ophthalmic (eye), 2 times daily    syringe with needle 3 mL 25 gauge x 1\" syringe Use as directed with b12 injections       Physical Exam:  Constitutional:       General: Awake.      Appearance: Not in distress. Obese. Chronically ill-appearing.   Neck:      Vascular: No JVR. JVD normal.   Pulmonary:      Effort: Pulmonary effort is normal.      Breath sounds: Normal breath sounds. No wheezing. No rhonchi. No rales.   Chest:      Chest wall: Not tender to palpatation.   Cardiovascular:      PMI at left midclavicular line. Normal rate. Regular rhythm. Normal S1. Normal S2.       Murmurs: There is no murmur.      No gallop.  No click. No rub.   Pulses:     Intact distal pulses. "   Edema:     Peripheral edema absent.   Abdominal:      General: Abdomen is protuberant. Bowel sounds are normal.      Palpations: Abdomen is soft.      Tenderness: There is no abdominal tenderness.   Musculoskeletal: Normal range of motion.         General: No tenderness.      Comments: Walks with walker Skin:     General: Skin is warm and dry.   Neurological:      General: No focal deficit present.      Mental Status: Alert and oriented to person, place and time.          Last Labs:  CBC -  Lab Results   Component Value Date    WBC 9.3 03/11/2022    HGB 12.5 03/11/2022    HCT 39.0 03/11/2022    MCV 87 03/11/2022     03/11/2022       CMP -  Lab Results   Component Value Date    CALCIUM 9.4 02/08/2023    PROT 7.3 03/05/2022    ALBUMIN 4.2 03/05/2022    AST 18 03/05/2022    ALT 11 03/05/2022    ALKPHOS 105 03/05/2022    BILITOT 0.7 03/05/2022       LIPID PANEL -   Lab Results   Component Value Date    CHOL 107 03/06/2022    TRIG 114 03/06/2022    HDL 41.4 03/06/2022    CHHDL 2.6 03/06/2022    LDLF 43 03/06/2022    VLDL 23 03/06/2022 4/2024  Total cholesterol 114  Triglycerides 75  HDL 73  LDL 26    RENAL FUNCTION PANEL -   Lab Results   Component Value Date    GLUCOSE 100 (H) 02/08/2023     02/08/2023    K 4.6 02/08/2023     02/08/2023    CO2 28 02/08/2023    ANIONGAP 13 02/08/2023    BUN 21 02/08/2023    CREATININE 1.09 (H) 02/08/2023    CALCIUM 9.4 02/08/2023    ALBUMIN 4.2 03/05/2022 4/2024   Cr=1.11  K=4.2    Lab Results   Component Value Date    HGBA1C 5.8 (H) 11/11/2023           Lab review: I have personally reviewed the laboratory result(s)       Problem List Items Addressed This Visit       AICD (automatic cardioverter/defibrillator) present (Chronic)    Overview     Placed 3/2022 for VT   Follow with EP    Medtronic [place] XT  MRI implanted March 10, 2022:  -RA Medtronic [id number]  -RV Medtronic [id number]  -Estimated battery longevity 9 years, 3 months based on device interrogation August  2024         HTN (hypertension)    Overview     dBP low on my reading today  Has some orthostatic  symptoms-no med increase  Follow         Hyperlipidemia    Overview     On statin / Zetia   4/2024 LDL=26         Myocarditis (Multi)    Overview     Per 3/2022 cardiac MRI (EF 58%)  NL LVEF   No obstructive CAD per 3/2022 cath   On ARB  No signs CHF         Sustained VT (ventricular tachycardia) (Multi) (Chronic)    Overview     Noted on 3/2022 hospital admit   On amiodarone and AICD in place   Note -was on beta blocker but Dc'ed during 11/2023 hospital admission for low BP/seizure  Follow         CAD (coronary artery disease) - Primary    Overview     Mild irregularities with intramyocardial LAD per 3/2022 cath   On ASA / statin (no beta  blocker with low BP)- no angina         Paroxysmal atrial fibrillation (Multi)    Overview     Noted on 8/2022 AICD interrogation  In NSR today   Has [id number] score=3-on Eliquis                  Weight loss  Giovani Burns, DO

## 2025-02-17 ENCOUNTER — HOSPITAL ENCOUNTER (OUTPATIENT)
Dept: CARDIOLOGY | Facility: CLINIC | Age: 80
Discharge: HOME | End: 2025-02-17
Payer: MEDICARE

## 2025-02-17 DIAGNOSIS — Z95.810 AICD (AUTOMATIC CARDIOVERTER/DEFIBRILLATOR) PRESENT: ICD-10-CM

## 2025-02-17 DIAGNOSIS — I47.29 PAROXYSMAL VENTRICULAR TACHYCARDIA (MULTI): ICD-10-CM

## 2025-02-17 PROCEDURE — 93296 REM INTERROG EVL PM/IDS: CPT

## 2025-05-19 ENCOUNTER — HOSPITAL ENCOUNTER (OUTPATIENT)
Dept: CARDIOLOGY | Facility: CLINIC | Age: 80
Discharge: HOME | End: 2025-05-19
Payer: MEDICARE

## 2025-05-19 DIAGNOSIS — I47.20 PAROXYSMAL VENTRICULAR TACHYCARDIA: ICD-10-CM

## 2025-05-19 DIAGNOSIS — Z95.810 AICD (AUTOMATIC CARDIOVERTER/DEFIBRILLATOR) PRESENT: ICD-10-CM

## 2025-05-19 PROCEDURE — 93296 REM INTERROG EVL PM/IDS: CPT

## 2025-05-28 PROBLEM — H40.1131 PRIMARY OPEN-ANGLE GLAUCOMA, BILATERAL, MILD STAGE: Status: ACTIVE | Noted: 2025-03-13

## 2025-05-29 ENCOUNTER — OFFICE VISIT (OUTPATIENT)
Dept: CARDIOLOGY | Facility: CLINIC | Age: 80
End: 2025-05-29
Payer: MEDICARE

## 2025-05-29 VITALS
WEIGHT: 180 LBS | DIASTOLIC BLOOD PRESSURE: 72 MMHG | OXYGEN SATURATION: 96 % | BODY MASS INDEX: 29.95 KG/M2 | SYSTOLIC BLOOD PRESSURE: 140 MMHG | HEART RATE: 84 BPM

## 2025-05-29 DIAGNOSIS — E78.00 PURE HYPERCHOLESTEROLEMIA: ICD-10-CM

## 2025-05-29 DIAGNOSIS — K92.1 BLOOD IN STOOL: ICD-10-CM

## 2025-05-29 DIAGNOSIS — Z95.810 AICD (AUTOMATIC CARDIOVERTER/DEFIBRILLATOR) PRESENT: Chronic | ICD-10-CM

## 2025-05-29 DIAGNOSIS — Z09 HOSPITAL DISCHARGE FOLLOW-UP: Primary | ICD-10-CM

## 2025-05-29 DIAGNOSIS — I48.0 PAROXYSMAL ATRIAL FIBRILLATION (MULTI): ICD-10-CM

## 2025-05-29 DIAGNOSIS — I51.4 MYOCARDITIS, UNSPECIFIED CHRONICITY, UNSPECIFIED MYOCARDITIS TYPE (MULTI): ICD-10-CM

## 2025-05-29 DIAGNOSIS — I47.20 SUSTAINED VT (VENTRICULAR TACHYCARDIA) (MULTI): Chronic | ICD-10-CM

## 2025-05-29 PROBLEM — R55 SYNCOPE AND COLLAPSE: Status: ACTIVE | Noted: 2025-05-29

## 2025-05-29 PROCEDURE — 3078F DIAST BP <80 MM HG: CPT | Performed by: INTERNAL MEDICINE

## 2025-05-29 PROCEDURE — 99212 OFFICE O/P EST SF 10 MIN: CPT | Performed by: INTERNAL MEDICINE

## 2025-05-29 PROCEDURE — 3075F SYST BP GE 130 - 139MM HG: CPT | Performed by: INTERNAL MEDICINE

## 2025-05-29 PROCEDURE — 1159F MED LIST DOCD IN RCRD: CPT | Performed by: INTERNAL MEDICINE

## 2025-05-29 PROCEDURE — G2211 COMPLEX E/M VISIT ADD ON: HCPCS | Performed by: INTERNAL MEDICINE

## 2025-05-29 PROCEDURE — 1036F TOBACCO NON-USER: CPT | Performed by: INTERNAL MEDICINE

## 2025-05-29 PROCEDURE — 1160F RVW MEDS BY RX/DR IN RCRD: CPT | Performed by: INTERNAL MEDICINE

## 2025-05-29 PROCEDURE — 99215 OFFICE O/P EST HI 40 MIN: CPT | Performed by: INTERNAL MEDICINE

## 2025-05-29 NOTE — PROGRESS NOTES
"Chief Complaint:   6 month follow up , Coronary Artery Disease, Hypertension, SVT, and Atrial Fibrillation     History Of Present Illness:    Radha Addison is a 80 y.o. female presenting for CV dz  Patient was admitted to [facility] 2/2025 with syncope.  She was seen by the EP service-they did not think it was cardiac syncope.  No episodes since    Patient denies chest pain/SOB/dizziness/LH/ edema/claudication  Active-works in garden    Some blood in stool     Last Recorded Vitals:  Vitals:    05/29/25 1346 05/29/25 1412   BP: 130/70 140/72   BP Location: Left arm    Patient Position: Sitting    BP Cuff Size: Adult    Pulse: 84    SpO2: 96%    Weight: 81.6 kg (180 lb)             Allergies:  Pollen extracts    Outpatient Medications:  Current Outpatient Medications   Medication Instructions    amiodarone (PACERONE) 200 mg, oral, Daily    apixaban (ELIQUIS) 5 mg, oral, 2 times daily    atorvastatin (Lipitor) 40 mg tablet 1 tablet, Daily    cholecalciferol, vitamin D3, (D-3-5 ORAL) 1 capsule, Daily    cyanocobalamin (VITAMIN B-12) 1,000 mcg, Every 30 days    ferrous sulfate 325 (65 Fe) MG tablet Take by mouth.    levETIRAcetam XR (KEPPRA XR) 500 mg, 2 times daily    losartan (COZAAR) 50 mg, oral, Daily    olopatadine (Pataday) 0.2 % ophthalmic solution 1 drop, 2 times daily    syringe with needle 3 mL 25 gauge x 1\" syringe Use as directed with b12 injections       Physical Exam:  Constitutional:       General: Awake.      Appearance: Not in distress. Obese. Chronically ill-appearing.   Neck:      Vascular: No JVR. JVD normal.   Pulmonary:      Effort: Pulmonary effort is normal.      Breath sounds: Normal breath sounds. No wheezing. No rhonchi. No rales.   Chest:      Chest wall: Not tender to palpatation.   Cardiovascular:      PMI at left midclavicular line. Normal rate. Regular rhythm. Normal S1. Normal S2.       Murmurs: There is no murmur.      No gallop.  No click. No rub.   Pulses:     Intact distal " pulses.   Edema:     Peripheral edema absent.   Abdominal:      General: Abdomen is protuberant. Bowel sounds are normal.      Palpations: Abdomen is soft.      Tenderness: There is no abdominal tenderness.   Musculoskeletal: Normal range of motion.         General: No tenderness.      Comments: Walks with walker Skin:     General: Skin is warm and dry.   Neurological:      General: No focal deficit present.      Mental Status: Alert and oriented to person, place and time.          Last Labs:  CBC -  Lab Results   Component Value Date    WBC 9.3 03/11/2022    HGB 12.5 03/11/2022    HCT 39.0 03/11/2022    MCV 87 03/11/2022     03/11/2022       CMP -  Lab Results   Component Value Date    CALCIUM 9.4 02/08/2023    PROT 7.3 03/05/2022    ALBUMIN 4.2 03/05/2022    AST 18 03/05/2022    ALT 11 03/05/2022    ALKPHOS 105 03/05/2022    BILITOT 0.7 03/05/2022       LIPID PANEL -   Lab Results   Component Value Date    CHOL 107 03/06/2022    TRIG 114 03/06/2022    HDL 41.4 03/06/2022    CHHDL 2.6 03/06/2022    LDLF 43 03/06/2022    VLDL 23 03/06/2022   10/2024  Total cholesterol 110  Triglycerides 84  HDL 62  LDL 31    RENAL FUNCTION PANEL -   Lab Results   Component Value Date    GLUCOSE 100 (H) 02/08/2023     02/08/2023    K 4.6 02/08/2023     02/08/2023    CO2 28 02/08/2023    ANIONGAP 13 02/08/2023    BUN 21 02/08/2023    CREATININE 1.09 (H) 02/08/2023    CALCIUM 9.4 02/08/2023    ALBUMIN 4.2 03/05/2022    10/2024   Cr=1.  09  K= 5.1    Lab Results   Component Value Date    HGBA1C 5.8 (H) 11/11/2023           Lab review: I have personally reviewed the laboratory result(s)       Problem List Items Addressed This Visit       AICD (automatic cardioverter/defibrillator) present (Chronic)    Overview   Placed 3/2022 for VT   Follow with EP    Medtronic [place] XT  MRI implanted March 10, 2022:  -RA Medtronic [id number]  -RV Medtronic [id number]  -Estimated battery longevity 9 years, 3 months based on device  interrogation August 2024         Hyperlipidemia    Overview   On statin / Zetia   10/2024 LDL=31-DC ezetimibe         Myocarditis    Overview   Per 3/2022 cardiac MRI (EF 58%)  NL LVEF   No obstructive CAD per 3/2022 cath   On ARB  No signs CHF         Sustained VT (ventricular tachycardia) (Multi) (Chronic)    Overview   Noted on 3/2022 hospital admit   On amiodarone and AICD in place   Note -was on beta blocker but Dc'ed during 11/2023 hospital admission for low BP/seizure  Follow         Hospital discharge follow-up - Primary    Overview   Records reviewed         Paroxysmal atrial fibrillation (Multi)    Overview   Noted on 8/2022 AICD interrogation  In NSR today   Has [id number] score=3-on Eliquis           Blood in stool    Overview   To see GI about colonoscopy              Dc ezetimibe as cholesterol very low  Weight loss  Giovani Burns, DO

## 2025-06-05 ENCOUNTER — APPOINTMENT (OUTPATIENT)
Dept: CARDIOLOGY | Facility: CLINIC | Age: 80
End: 2025-06-05
Payer: MEDICARE

## 2025-08-18 ENCOUNTER — HOSPITAL ENCOUNTER (OUTPATIENT)
Dept: CARDIOLOGY | Facility: CLINIC | Age: 80
Discharge: HOME | End: 2025-08-18
Payer: MEDICARE

## 2025-08-18 DIAGNOSIS — Z95.810 AICD (AUTOMATIC CARDIOVERTER/DEFIBRILLATOR) PRESENT: ICD-10-CM

## 2025-08-18 DIAGNOSIS — I47.20 SUSTAINED VT (VENTRICULAR TACHYCARDIA) (MULTI): ICD-10-CM

## 2025-08-18 PROCEDURE — 93296 REM INTERROG EVL PM/IDS: CPT

## 2025-09-03 ENCOUNTER — HOSPITAL ENCOUNTER (OUTPATIENT)
Dept: CARDIOLOGY | Facility: CLINIC | Age: 80
Discharge: HOME | End: 2025-09-03
Payer: MEDICARE

## 2025-09-03 DIAGNOSIS — I47.29 OTHER VENTRICULAR TACHYCARDIA: ICD-10-CM

## 2025-09-03 DIAGNOSIS — Z95.810 PRESENCE OF AUTOMATIC (IMPLANTABLE) CARDIAC DEFIBRILLATOR: ICD-10-CM

## 2025-09-03 PROCEDURE — 93283 PRGRMG EVAL IMPLANTABLE DFB: CPT

## 2025-09-03 PROCEDURE — 93283 PRGRMG EVAL IMPLANTABLE DFB: CPT | Performed by: INTERNAL MEDICINE
